# Patient Record
Sex: MALE | Race: WHITE | NOT HISPANIC OR LATINO | Employment: FULL TIME | URBAN - METROPOLITAN AREA
[De-identification: names, ages, dates, MRNs, and addresses within clinical notes are randomized per-mention and may not be internally consistent; named-entity substitution may affect disease eponyms.]

---

## 2023-05-31 ASSESSMENT — ENCOUNTER SYMPTOMS
LEG PAIN: 1
SINUS CONGESTION: 0
NECK MASS: 0
INSOMNIA: 1
EXERCISE INTOLERANCE: 0
SYNCOPE: 0
PALPITATIONS: 1
TROUBLE SWALLOWING: 0
ORTHOPNEA: 0
SLEEP DISTURBANCES DUE TO BREATHING: 0
NERVOUS/ANXIOUS: 1
LIGHT-HEADEDNESS: 1
HOARSE VOICE: 0
DECREASED CONCENTRATION: 1
SMELL DISTURBANCE: 0
HYPOTENSION: 0
SINUS PAIN: 0
TASTE DISTURBANCE: 0
HYPERTENSION: 1
DEPRESSION: 1
PANIC: 0
SORE THROAT: 1

## 2023-06-01 ENCOUNTER — OFFICE VISIT (OUTPATIENT)
Dept: INTERNAL MEDICINE | Facility: CLINIC | Age: 28
End: 2023-06-01
Payer: COMMERCIAL

## 2023-06-01 ENCOUNTER — LAB (OUTPATIENT)
Dept: LAB | Facility: CLINIC | Age: 28
End: 2023-06-01
Payer: COMMERCIAL

## 2023-06-01 VITALS
WEIGHT: 188.6 LBS | DIASTOLIC BLOOD PRESSURE: 67 MMHG | HEART RATE: 54 BPM | SYSTOLIC BLOOD PRESSURE: 103 MMHG | OXYGEN SATURATION: 97 %

## 2023-06-01 DIAGNOSIS — M25.551 HIP PAIN, RIGHT: ICD-10-CM

## 2023-06-01 DIAGNOSIS — M22.2X2 PATELLOFEMORAL SYNDROME, LEFT: ICD-10-CM

## 2023-06-01 DIAGNOSIS — Z13.6 CARDIOVASCULAR SCREENING; LDL GOAL LESS THAN 160: Primary | ICD-10-CM

## 2023-06-01 DIAGNOSIS — Z13.6 CARDIOVASCULAR SCREENING; LDL GOAL LESS THAN 160: ICD-10-CM

## 2023-06-01 DIAGNOSIS — Z00.00 HEALTHCARE MAINTENANCE: ICD-10-CM

## 2023-06-01 LAB
ANION GAP SERPL CALCULATED.3IONS-SCNC: 8 MMOL/L (ref 7–15)
BUN SERPL-MCNC: 16.7 MG/DL (ref 6–20)
CALCIUM SERPL-MCNC: 9.8 MG/DL (ref 8.6–10)
CHLORIDE SERPL-SCNC: 103 MMOL/L (ref 98–107)
CHOLEST SERPL-MCNC: 177 MG/DL
CREAT SERPL-MCNC: 1.08 MG/DL (ref 0.67–1.17)
DEPRECATED HCO3 PLAS-SCNC: 30 MMOL/L (ref 22–29)
GFR SERPL CREATININE-BSD FRML MDRD: >90 ML/MIN/1.73M2
GLUCOSE SERPL-MCNC: 93 MG/DL (ref 70–99)
HDLC SERPL-MCNC: 58 MG/DL
LDLC SERPL CALC-MCNC: 92 MG/DL
NONHDLC SERPL-MCNC: 119 MG/DL
POTASSIUM SERPL-SCNC: 4 MMOL/L (ref 3.4–5.3)
SODIUM SERPL-SCNC: 141 MMOL/L (ref 136–145)
TRIGL SERPL-MCNC: 135 MG/DL

## 2023-06-01 PROCEDURE — 36415 COLL VENOUS BLD VENIPUNCTURE: CPT | Performed by: PATHOLOGY

## 2023-06-01 PROCEDURE — 80061 LIPID PANEL: CPT | Performed by: PATHOLOGY

## 2023-06-01 PROCEDURE — 80048 BASIC METABOLIC PNL TOTAL CA: CPT | Performed by: PATHOLOGY

## 2023-06-01 PROCEDURE — 99203 OFFICE O/P NEW LOW 30 MIN: CPT | Performed by: HOSPITALIST

## 2023-06-01 RX ORDER — ACETAMINOPHEN 500 MG
500 TABLET ORAL
COMMUNITY

## 2023-06-01 RX ORDER — PROPRANOLOL HYDROCHLORIDE 20 MG/1
20 TABLET ORAL DAILY PRN
COMMUNITY
Start: 2023-06-01 | End: 2023-11-14

## 2023-06-01 RX ORDER — PROPRANOLOL HYDROCHLORIDE 20 MG/1
1 TABLET ORAL
COMMUNITY
Start: 2023-02-22 | End: 2023-06-01

## 2023-06-01 RX ORDER — CITALOPRAM HYDROBROMIDE 20 MG/1
1 TABLET ORAL
COMMUNITY
Start: 2023-02-22 | End: 2023-11-14

## 2023-06-01 ASSESSMENT — ENCOUNTER SYMPTOMS
CONSTIPATION: 0
DIARRHEA: 0
PALPITATIONS: 1
NERVOUS/ANXIOUS: 1
ABDOMINAL PAIN: 0
SORE THROAT: 1
SINUS PAIN: 0
CHILLS: 0
SHORTNESS OF BREATH: 0
FEVER: 0
DECREASED CONCENTRATION: 1
ARTHRALGIAS: 1
LIGHT-HEADEDNESS: 1
DYSURIA: 0
FREQUENCY: 0
TROUBLE SWALLOWING: 0

## 2023-06-01 NOTE — PROGRESS NOTES
Assessment/Plan  Problem List Items Addressed This Visit        Nervous and Auditory    Hip pain, right     Likely tendon strain of upper quadraceps and adductors.   - If right hip worsen, may consider physical therapy.          Relevant Medications    acetaminophen (TYLENOL) 500 MG tablet    Patellofemoral syndrome, left     - If left knee worsen, may consider physical therapy.          Relevant Medications    acetaminophen (TYLENOL) 500 MG tablet       Other    CARDIOVASCULAR SCREENING; LDL GOAL LESS THAN 160 - Primary    Relevant Orders    Basic metabolic panel  (Ca, Cl, CO2, Creat, Gluc, K, Na, BUN)    Lipid panel reflex to direct LDL Fasting    Healthcare maintenance     - If you haven't had a Tetanus vaccine in the past 10 years, would recommend obtaining at Pharmacy.   - Consider HPV vaccine completion, need #2 and #3 vaccines. May set up a nurse visit or go to pharmacy.   - Labs for BMP and Lipid while fasting.            No results found for any visits on 06/01/23.    Health Maintenance Due   Topic Date Due     YEARLY PREVENTIVE VISIT  Never done     ADVANCE CARE PLANNING  Never done     HIV SCREENING  Never done     HEPATITIS C SCREENING  Never done     HPV IMMUNIZATION (3 - Male 3-dose series) 07/18/2020     PHQ-2 (once per calendar year)  Never done         Subjective  Here to establish care. Was living in [place], [place] for about 2 years. Moved here in 4/2022. Started on celexa and propranolol about 3 years ago. Does have a seldom coffee but no other caffeine.     Mentions he has right hip pain along the anterior region when exercising with walking and strength. Has prior knee patellofemoral syndrome for left knee and no longer runs, does have swelling frequently. Did not have physical therapy for knee. Wears shoe inserts but maybe wearing down. Takes ibuprofen once about every other day.     Mentions left wrist fracture in grad school has trouble bending when it gets cold out.      Review of Systems    Constitutional: Negative for chills and fever.   HENT: Positive for hearing loss, sore throat and tinnitus. Negative for ear discharge, ear pain, mouth sores, nosebleeds, sinus pain and trouble swallowing.    Respiratory: Negative for shortness of breath.    Cardiovascular: Positive for palpitations. Negative for chest pain.   Gastrointestinal: Negative for abdominal pain, constipation and diarrhea.   Genitourinary: Negative for dysuria and frequency.   Musculoskeletal: Positive for arthralgias.   Skin: Negative for rash.   Neurological: Positive for light-headedness.   Psychiatric/Behavioral: Positive for decreased concentration. The patient is nervous/anxious.        History  Past Medical History:   Diagnosis Date     Anxiety      Closed fracture of left clavicle      Hip pain, right      Hx of concussion     in High school, while sledding.     Patellofemoral syndrome, left      Wrist fracture, left, closed, initial encounter     grad school       Past Surgical History:   Procedure Laterality Date     TONSILLECTOMY       WISDOM TOOTH EXTRACTION         Family History   Problem Relation Age of Onset     Prostate Cancer Father 55     Diabetes Father      Cerebrovascular Disease Brother      Dementia Maternal Grandmother      Cerebrovascular Disease Paternal Grandmother      Dementia Paternal Grandmother      Colon Cancer Paternal Uncle         unknown age       Social History     Tobacco Use     Smoking status: Not on file     Smokeless tobacco: Not on file   Substance Use Topics     Alcohol use: Not on file        Objective  /67 (BP Location: Right arm, Patient Position: Sitting, Cuff Size: Adult Regular)   Pulse 97   Wt 85.5 kg (188 lb 9.6 oz)   SpO2 (!) 54%   Vitals taken by Froy Huerta MD    Physical Exam  Constitutional:       General: He is not in acute distress.     Appearance: He is not ill-appearing or toxic-appearing.   HENT:      Head: Normocephalic.   Eyes:      Conjunctiva/sclera:  Conjunctivae normal.   Cardiovascular:      Rate and Rhythm: Regular rhythm.      Heart sounds: Normal heart sounds. No murmur heard.     No friction rub. No gallop.   Pulmonary:      Effort: Pulmonary effort is normal. No respiratory distress.      Breath sounds: Normal breath sounds. No wheezing, rhonchi or rales.   Abdominal:      General: Bowel sounds are normal. There is no distension.      Palpations: Abdomen is soft. There is no mass.      Tenderness: There is no abdominal tenderness.      Hernia: No hernia is present.   Musculoskeletal:      Right lower leg: No edema.      Left lower leg: No edema.      Comments: Left lateral patella with mild tenderness. Mild left knee effusion. Mild effusion of right knee. No laxity of left knee.  Right anterior superior quadraceps with mild tenderness. No tenderness pushing right leg into right hip.    Neurological:      Mental Status: He is alert.   Psychiatric:         Mood and Affect: Mood normal.         Thought Content: Thought content normal.          35 minutes spent on the date of the encounter doing chart review, history and exam, documentation and further activities per the note.      Return in about 1 year (around 6/1/2024).        Froy Huerta MD  [facility] INTERNAL MEDICINE [place]    Answers for HPI/ROS submitted by the patient on 5/31/2023  General Symptoms: No  Skin Symptoms: No  HENT Symptoms: Yes  EYE SYMPTOMS: No  HEART SYMPTOMS: Yes  LUNG SYMPTOMS: No  INTESTINAL SYMPTOMS: No  URINARY SYMPTOMS: No  REPRODUCTIVE SYMPTOMS: No  SKELETAL SYMPTOMS: No  BLOOD SYMPTOMS: No  NERVOUS SYSTEM SYMPTOMS: No  MENTAL HEALTH SYMPTOMS: Yes  Congestion: No   Voice hoarseness: No  Tooth pain: No  Gum tenderness: No  Bleeding gums: No  Change in taste: No  Change in sense of smell: No  Dry mouth: No  Hearing aid used: No  Neck lump: No  Pain in legs with walking: Yes  Trouble breathing while lying down: No  Fingers or toes appear blue: No  High  blood pressure: Yes  Low blood pressure: No  Fainting: No  Murmurs: Yes  Pacemaker: No  Varicose veins: No  Edema or swelling: No  Wake up at night with shortness of breath: No  Exercise intolerance: No  Depression: Yes  Trouble sleeping: Yes  Mood changes: Yes  Panic attacks: No

## 2023-06-01 NOTE — ASSESSMENT & PLAN NOTE
- If you haven't had a Tetanus vaccine in the past 10 years, would recommend obtaining at Pharmacy.   - Consider HPV vaccine completion, need #2 and #3 vaccines. May set up a nurse visit or go to pharmacy.   - Labs for BMP and Lipid while fasting.

## 2023-06-01 NOTE — NURSING NOTE
Jack Collier is a 27 year old male patient that presents today in clinic for the following:    Chief Complaint   Patient presents with     Establish Care     The patient's allergies and medications were reviewed as noted. A set of vitals were recorded as noted without incident: /67 (BP Location: Right arm, Patient Position: Sitting, Cuff Size: Adult Regular)   Pulse 54   Wt 85.5 kg (188 lb 9.6 oz)   SpO2 97% . The patient does not have any other questions for the provider.    Digna Ferrera, EMT at 7:08 AM on 6/1/2023

## 2023-06-01 NOTE — ASSESSMENT & PLAN NOTE
Likely tendon strain of upper quadraceps and adductors.   - If right hip worsen, may consider physical therapy.

## 2023-06-01 NOTE — PATIENT INSTRUCTIONS
- NURIA for [facility] in [place] notes the past 5 years.   - If left knee or right hip worsen, may consider physical therapy.   - If you haven't had a Tetanus vaccine in the past 10 years, would recommend obtaining at Pharmacy.   - Consider HPV vaccine completion, need #2 and #3 vaccines. May set up a nurse visit or go to pharmacy.   - Labs for BMP and Lipid while fasting.  - May set up MyChart for [facility].    Follow up in a year or as needed.

## 2023-07-26 ENCOUNTER — ALLIED HEALTH/NURSE VISIT (OUTPATIENT)
Dept: INTERNAL MEDICINE | Facility: CLINIC | Age: 28
End: 2023-07-26
Payer: COMMERCIAL

## 2023-07-26 DIAGNOSIS — Z23 NEED FOR VACCINATION: Primary | ICD-10-CM

## 2023-07-26 PROCEDURE — 90651 9VHPV VACCINE 2/3 DOSE IM: CPT

## 2023-07-26 PROCEDURE — 90471 IMMUNIZATION ADMIN: CPT

## 2023-07-26 NOTE — PROGRESS NOTES
Jack Collier received the first HPV vaccine today in clinic at the request of Dr. Huerta. The immunization was given under the supervision of Dr. Johnson if assistance was needed. The patient does not report a history of adverse reactions associated with vaccine administration. The immunization site was cleaned with an alcohol prep wipe. The immunization was given without incident--see immunization list for administration details. No swelling or redness was observed at the site of injection after the immunization was given. The patient was advised to remain in fourth floor lobby of the [facility] for fifteen minutes after the injection in case of an adverse reaction.     KEVIN Agrawal   3:16 PM 7/26/2023

## 2023-08-13 ENCOUNTER — HEALTH MAINTENANCE LETTER (OUTPATIENT)
Age: 28
End: 2023-08-13

## 2023-08-28 ENCOUNTER — ALLIED HEALTH/NURSE VISIT (OUTPATIENT)
Dept: INTERNAL MEDICINE | Facility: CLINIC | Age: 28
End: 2023-08-28
Payer: COMMERCIAL

## 2023-08-28 DIAGNOSIS — Z23 NEED FOR VACCINATION: Primary | ICD-10-CM

## 2023-08-28 PROCEDURE — 90651 9VHPV VACCINE 2/3 DOSE IM: CPT

## 2023-08-28 PROCEDURE — 90471 IMMUNIZATION ADMIN: CPT

## 2023-08-28 NOTE — PROGRESS NOTES
Chief Complaint   Patient presents with    Imm/Inj     2nd HPV       Patient Active Problem List   Diagnosis    Hip pain, right    Patellofemoral syndrome, left    CARDIOVASCULAR SCREENING; LDL GOAL LESS THAN 160    Healthcare maintenance       No Known Allergies    Current Outpatient Medications   Medication Sig Dispense Refill    acetaminophen (TYLENOL) 500 MG tablet Take 500 mg by mouth      citalopram (CELEXA) 20 MG tablet Take 1 tablet by mouth daily at 2 pm      propranolol (INDERAL) 20 MG tablet Take 1 tablet (20 mg) by mouth daily as needed (anxiety)         Social History     Tobacco Use    Smoking status: Former     Packs/day: 0.20     Years: 6.00     Pack years: 1.20     Types: Cigarettes     Quit date: 2022     Years since quittin.4    Smokeless tobacco: Former     Types: Chew     Quit date: 2022   Substance Use Topics    Alcohol use: Yes     Alcohol/week: 3.0 standard drinks of alcohol     Types: 3 Standard drinks or equivalent per week     Comment: social events, occasional weekend with a glass of wine    Drug use: Yes     Types: Marijuana     Comment: occasional a few times every couple months       Jack Collier comes into clinic today at the request of Dr. Froy Huerta for second HPV immunization.    Patient diagnosis: Need for vaccination    This service provided today was under the direct supervision of Dr. Valles, who was available if needed.    The following medication was given (also documented on immunization record):     MEDICATION:  Human Papillomavirus 9-valent Vaccine (Gardasil-9)  ROUTE: IM  SITE: Deltoid - Left  DOSE: Single dose - 0.5 mL  LOT #: [id number]  : [employer], [employer]  EXPIRATION DATE: Oct 20 2024  NDC#: 9759-3165-77   Was there drug waste? No    Prior to injection, verified patient identity using patient's name and date of birth.  Due to injection administration, patient instructed to remain in clinic for 15 minutes  afterwards, and to report any  adverse reaction to me immediately.    Drug Amount Wasted:  None.  Vial/Syringe: Single dose vial    Lizz Garibay LPN  August 28, 2023  9:46 AM

## 2023-09-18 ENCOUNTER — ALLIED HEALTH/NURSE VISIT (OUTPATIENT)
Dept: INTERNAL MEDICINE | Facility: CLINIC | Age: 28
End: 2023-09-18
Payer: COMMERCIAL

## 2023-09-18 DIAGNOSIS — Z23 NEED FOR VACCINATION: Primary | ICD-10-CM

## 2023-09-18 PROCEDURE — 90471 IMMUNIZATION ADMIN: CPT

## 2023-09-18 PROCEDURE — 90686 IIV4 VACC NO PRSV 0.5 ML IM: CPT

## 2023-09-18 NOTE — PROGRESS NOTES
Jack Collier received the flu vaccine today in clinic at the request of the patient. The immunization was given under the supervision of Dr. Johnson if assistance was needed. The patient does not report a history of adverse reactions associated with vaccine administration. The immunization site was cleaned with an alcohol prep wipe. The immunization was given without incident--see immunization list for administration details. No swelling or redness was observed at the site of injection after the immunization was given. The patient was advised to remain in fourth floor lobby of the [facility] for fifteen minutes after the injection in case of an adverse reaction.     KEVIN Agrawal   10:49 AM 9/18/2023

## 2023-11-14 ENCOUNTER — MYC REFILL (OUTPATIENT)
Dept: INTERNAL MEDICINE | Facility: CLINIC | Age: 28
End: 2023-11-14
Payer: COMMERCIAL

## 2023-11-14 DIAGNOSIS — F41.9 ANXIETY: Primary | ICD-10-CM

## 2023-11-14 NOTE — TELEPHONE ENCOUNTER
propranolol (INDERAL) 20 MG tablet   6/1/2023  No   Sig - Route: Take 1 tablet (20 mg) by mouth daily as needed (anxiety) - Oral   Class: Historical   Order: [id number]          citalopram (CELEXA) 20 MG tablet   2/22/2023  --   Sig - Route: Take 1 tablet by mouth daily at 2 pm - Oral   Class: Historical   Order: [id number]         Last Office Visit : 6/1/23  Future Office visit:  0    Routing refill request to provider for review/approval because:  Medication is reported/historical

## 2023-11-16 RX ORDER — PROPRANOLOL HYDROCHLORIDE 20 MG/1
20 TABLET ORAL DAILY PRN
Qty: 90 TABLET | Refills: 1 | Status: SHIPPED | OUTPATIENT
Start: 2023-11-16 | End: 2024-02-15

## 2023-11-16 RX ORDER — CITALOPRAM HYDROBROMIDE 20 MG/1
20 TABLET ORAL
Qty: 90 TABLET | Refills: 1 | Status: SHIPPED | OUTPATIENT
Start: 2023-11-16 | End: 2024-02-15

## 2024-01-29 ENCOUNTER — ALLIED HEALTH/NURSE VISIT (OUTPATIENT)
Dept: INTERNAL MEDICINE | Facility: CLINIC | Age: 29
End: 2024-01-29
Payer: COMMERCIAL

## 2024-01-29 DIAGNOSIS — Z23 NEED FOR VACCINATION: Primary | ICD-10-CM

## 2024-01-29 PROCEDURE — 99207 PR NO CHARGE NURSE ONLY: CPT

## 2024-01-29 PROCEDURE — 90651 9VHPV VACCINE 2/3 DOSE IM: CPT

## 2024-01-29 PROCEDURE — 90471 IMMUNIZATION ADMIN: CPT

## 2024-01-29 NOTE — PROGRESS NOTES
Jack Collier received the third and final HPV vaccine today in clinic at the request of Dr. Huerta. The immunization was given under the supervision of Dr. Johnson if assistance was needed. The patient does not report a history of adverse reactions associated with vaccine administration. The immunization site was cleaned with an alcohol prep wipe. The immunization was given without incident--see immunization list for administration details. No swelling or redness was observed at the site of injection after the immunization was given. The patient was advised to remain in fourth floor lobby of the [facility] for fifteen minutes after the injection in case of an adverse reaction.     Digna Ferrera, EMT at 9:15 AM on 1/29/2024

## 2024-02-05 ENCOUNTER — TELEPHONE (OUTPATIENT)
Dept: INTERNAL MEDICINE | Facility: CLINIC | Age: 29
End: 2024-02-05
Payer: COMMERCIAL

## 2024-02-05 NOTE — TELEPHONE ENCOUNTER
Left Voicemail (1st Attempt) and Sent Mychart (1st Attempt) for the patient to call back and schedule the following:    Appointment type: P PHYSICAL  Provider: PCP  Return date: 6/1/2024

## 2024-02-15 ENCOUNTER — OFFICE VISIT (OUTPATIENT)
Dept: INTERNAL MEDICINE | Facility: CLINIC | Age: 29
End: 2024-02-15
Payer: COMMERCIAL

## 2024-02-15 VITALS
HEIGHT: 74 IN | WEIGHT: 203.3 LBS | OXYGEN SATURATION: 99 % | DIASTOLIC BLOOD PRESSURE: 69 MMHG | BODY MASS INDEX: 26.09 KG/M2 | SYSTOLIC BLOOD PRESSURE: 102 MMHG | HEART RATE: 74 BPM

## 2024-02-15 DIAGNOSIS — H93.12 TINNITUS, LEFT: ICD-10-CM

## 2024-02-15 DIAGNOSIS — H66.90 ACUTE OTITIS MEDIA, UNSPECIFIED OTITIS MEDIA TYPE: Primary | ICD-10-CM

## 2024-02-15 DIAGNOSIS — F41.9 ANXIETY: ICD-10-CM

## 2024-02-15 PROCEDURE — 99214 OFFICE O/P EST MOD 30 MIN: CPT | Mod: GC

## 2024-02-15 RX ORDER — CITALOPRAM HYDROBROMIDE 20 MG/1
20 TABLET ORAL
Qty: 90 TABLET | Refills: 1 | Status: SHIPPED | OUTPATIENT
Start: 2024-02-15 | End: 2024-05-01

## 2024-02-15 RX ORDER — AMOXICILLIN AND CLAVULANATE POTASSIUM 562.5; 437.5; 62.5 MG/1; MG/1; MG/1
2 TABLET, MULTILAYER, EXTENDED RELEASE ORAL 2 TIMES DAILY
Qty: 20 TABLET | Refills: 0 | Status: SHIPPED | OUTPATIENT
Start: 2024-02-15 | End: 2024-02-20

## 2024-02-15 RX ORDER — PROPRANOLOL HYDROCHLORIDE 20 MG/1
20 TABLET ORAL DAILY PRN
Qty: 90 TABLET | Refills: 1 | Status: SHIPPED | OUTPATIENT
Start: 2024-02-15 | End: 2024-05-01

## 2024-02-15 NOTE — PROGRESS NOTES
Jack is a 28 year old that presents in clinic today for the following:     Chief Complaint   Patient presents with    Derm Problem     Pt has derm concerns on left side of head which has resolved and ear issues (has ringing ears, recurrent pain, trouble hearing) since 2/10/24           2/15/2024     9:15 AM   Additional Questions   Roomed by Lamar Shell   Accompanied by N/A     Screenings as of today     PHQ-2 Total Score (Adult) - Positive if 3 or more points; Administer   PHQ-9 if positive 1        Lamar Shell at 9:22 AM on 2/15/2024

## 2024-02-15 NOTE — PROGRESS NOTES
"=================  Subjective  ==============  Jack presents for the issues below.     Cherry angioma but in the interim has fully resolved this was on the left temporal area, and has had one before on the abdomen and was removed and the right epigastrium which I saw.   2. Ear issues - ringing ears recurrent pain trouble hearing since 2/10/2024 after thinking he used a qtip to push too hard and has since had left ear tinnitus, and pain, reduced hearing (per patient muffled)     Would also want refills for meds. (Propranolol and citalopram)   No fevers, chills, other systemic signs, vision.   Has had ear infections as a child. Remembers has taken amoxicillin before likely in high school he said; ( about 10 years or so ago)     Counseled on apt antibiotic use.         Derm Problem        Pt has derm concerns on left side of head which has resolved and ear issues (has ringing ears, recurrent pain, trouble hearing) since 2/10/24       ==============  Objective  ============  /69 (BP Location: Right arm, Patient Position: Sitting, Cuff Size: Adult Large)   Pulse 74   Ht 1.871 m (6' 1.66\")   Wt 92.2 kg (203 lb 4.8 oz)   SpO2 99%   BMI 26.34 kg/m        .General: alert and oriented x3, in no apparent cardiopulmonary distress   HEENT: normocephalic, atraumatic, MMM, PERRL, EOMI, no scleral icterus or chemosis, no conjunctival pallor   Right ear: normal appearance of tympanic membrane.   Initial left ear: questionable with cerumen behind tympanic membrane incomplete fluid level on the 8 o'clock position of ear drum, large cerumen burding bright bailey yellow c/f for trauma and with gentle washing,   Repeat left ear after gentle washing: repeat exam then notable for discontinous tympanic membrane, appears from blunt trauma.   Neurologic: Cranial nerve VIII bone conduction lateralizing to the left and reduced air conduction on the left.   5/5 strength in all 4 extremities, no dysmetria, normal gait, sensation " grossly intact   Pulmonary: breathing on room air.   Skin: No rashes, ecchymosis   Extremities: warm and well-perfused.  Musculoskeletal: No joint swelling, full ROM in all large joints   Psychiatric: Linear thought processes, normal speech, mood appropriate affect.       Labs reviewed- none   Imaging reviewed - none     ======================  Assessment and Plan   ======================  Jack Collier is a 28 y.o. male presenting with 5 day history of left ear pain, tinnitus, muffled hearing in setting of preceding subjectively perceived trauma using q-tip on the left ear precipitating these symptom. Exam as above initially questionable with cerumen behind tympanic membrane c/f for trauma and with gentle washing, repeat exam then notable for discontinuous tympanic membrane after ear consistent with possible blunt trauma with q-tip as per history. Conduction defect in left ear too it seems, given all these, will treat with a course of ammox-clav for 5 days for what may be otitis media (incomplete fluid level observed and foreign matter in wrong compartment) along with referral to ENT for ongoing management.     Jack was seen today for derm problem.    Diagnoses and all orders for this visit:    Acute otitis media, unspecified otitis media type  -     Adult ENT  Referral; Future  -     amoxicillin-clavulanate (AUGMENTIN XR) 1000-62.5 MG 12 hr tablet; Take 2 tablets by mouth 2 times daily for 5 days    Tinnitus, left  -     amoxicillin-clavulanate (AUGMENTIN XR) 1000-62.5 MG 12 hr tablet; Take 2 tablets by mouth 2 times daily for 5 days    Anxiety  -     citalopram (CELEXA) 20 MG tablet; Take 1 tablet (20 mg) by mouth daily at 2 pm  -     propranolol (INDERAL) 20 MG tablet; Take 1 tablet (20 mg) by mouth daily as needed (anxiety)          Follow up as needed;   Follow up with ENT as referred;appreciate their input.       Case discussed and patient seen and plans above jointly made attending Dr. Huerta.      Molly Jackson MD  PGY3   Internal Medicine  [facility]

## 2024-05-01 ENCOUNTER — OFFICE VISIT (OUTPATIENT)
Dept: INTERNAL MEDICINE | Facility: CLINIC | Age: 29
End: 2024-05-01
Payer: COMMERCIAL

## 2024-05-01 ENCOUNTER — LAB (OUTPATIENT)
Dept: LAB | Facility: CLINIC | Age: 29
End: 2024-05-01
Payer: COMMERCIAL

## 2024-05-01 VITALS
WEIGHT: 205.6 LBS | HEART RATE: 57 BPM | OXYGEN SATURATION: 96 % | DIASTOLIC BLOOD PRESSURE: 68 MMHG | SYSTOLIC BLOOD PRESSURE: 107 MMHG | BODY MASS INDEX: 26.64 KG/M2

## 2024-05-01 DIAGNOSIS — F41.9 ANXIETY: ICD-10-CM

## 2024-05-01 DIAGNOSIS — Z11.4 SCREENING FOR HIV (HUMAN IMMUNODEFICIENCY VIRUS): ICD-10-CM

## 2024-05-01 DIAGNOSIS — Z11.59 NEED FOR HEPATITIS C SCREENING TEST: ICD-10-CM

## 2024-05-01 DIAGNOSIS — Z00.00 ROUTINE GENERAL MEDICAL EXAMINATION AT A HEALTH CARE FACILITY: Primary | ICD-10-CM

## 2024-05-01 PROBLEM — F32.A DEPRESSION: Status: ACTIVE | Noted: 2020-10-23

## 2024-05-01 PROBLEM — H53.022 REFRACTIVE AMBLYOPIA OF LEFT EYE: Status: ACTIVE | Noted: 2024-05-01

## 2024-05-01 LAB
ANION GAP SERPL CALCULATED.3IONS-SCNC: 9 MMOL/L (ref 7–15)
BUN SERPL-MCNC: 12.1 MG/DL (ref 6–20)
CALCIUM SERPL-MCNC: 9.6 MG/DL (ref 8.6–10)
CHLORIDE SERPL-SCNC: 103 MMOL/L (ref 98–107)
CREAT SERPL-MCNC: 1.06 MG/DL (ref 0.67–1.17)
DEPRECATED HCO3 PLAS-SCNC: 28 MMOL/L (ref 22–29)
EGFRCR SERPLBLD CKD-EPI 2021: >90 ML/MIN/1.73M2
GLUCOSE SERPL-MCNC: 96 MG/DL (ref 70–99)
HCV AB SERPL QL IA: NONREACTIVE
HIV 1+2 AB+HIV1 P24 AG SERPL QL IA: NONREACTIVE
POTASSIUM SERPL-SCNC: 4.5 MMOL/L (ref 3.4–5.3)
SODIUM SERPL-SCNC: 140 MMOL/L (ref 135–145)
TSH SERPL DL<=0.005 MIU/L-ACNC: 2.04 UIU/ML (ref 0.3–4.2)

## 2024-05-01 PROCEDURE — 99395 PREV VISIT EST AGE 18-39: CPT

## 2024-05-01 PROCEDURE — 84443 ASSAY THYROID STIM HORMONE: CPT | Performed by: PATHOLOGY

## 2024-05-01 PROCEDURE — 36415 COLL VENOUS BLD VENIPUNCTURE: CPT | Performed by: PATHOLOGY

## 2024-05-01 PROCEDURE — 80048 BASIC METABOLIC PNL TOTAL CA: CPT | Performed by: PATHOLOGY

## 2024-05-01 PROCEDURE — 86803 HEPATITIS C AB TEST: CPT

## 2024-05-01 PROCEDURE — 99000 SPECIMEN HANDLING OFFICE-LAB: CPT | Performed by: PATHOLOGY

## 2024-05-01 PROCEDURE — 87389 HIV-1 AG W/HIV-1&-2 AB AG IA: CPT

## 2024-05-01 RX ORDER — PROPRANOLOL HYDROCHLORIDE 20 MG/1
20 TABLET ORAL 2 TIMES DAILY PRN
Qty: 180 TABLET | Refills: 1 | Status: SHIPPED | OUTPATIENT
Start: 2024-05-01

## 2024-05-01 RX ORDER — CITALOPRAM HYDROBROMIDE 20 MG/1
20 TABLET ORAL DAILY
Qty: 90 TABLET | Refills: 3 | Status: SHIPPED | OUTPATIENT
Start: 2024-05-01

## 2024-05-01 SDOH — HEALTH STABILITY: PHYSICAL HEALTH: ON AVERAGE, HOW MANY DAYS PER WEEK DO YOU ENGAGE IN MODERATE TO STRENUOUS EXERCISE (LIKE A BRISK WALK)?: 3 DAYS

## 2024-05-01 ASSESSMENT — ANXIETY QUESTIONNAIRES
6. BECOMING EASILY ANNOYED OR IRRITABLE: SEVERAL DAYS
3. WORRYING TOO MUCH ABOUT DIFFERENT THINGS: MORE THAN HALF THE DAYS
GAD7 TOTAL SCORE: 11
7. FEELING AFRAID AS IF SOMETHING AWFUL MIGHT HAPPEN: MORE THAN HALF THE DAYS
IF YOU CHECKED OFF ANY PROBLEMS ON THIS QUESTIONNAIRE, HOW DIFFICULT HAVE THESE PROBLEMS MADE IT FOR YOU TO DO YOUR WORK, TAKE CARE OF THINGS AT HOME, OR GET ALONG WITH OTHER PEOPLE: SOMEWHAT DIFFICULT
1. FEELING NERVOUS, ANXIOUS, OR ON EDGE: MORE THAN HALF THE DAYS
GAD7 TOTAL SCORE: 11
2. NOT BEING ABLE TO STOP OR CONTROL WORRYING: SEVERAL DAYS
5. BEING SO RESTLESS THAT IT IS HARD TO SIT STILL: SEVERAL DAYS

## 2024-05-01 ASSESSMENT — PATIENT HEALTH QUESTIONNAIRE - PHQ9
SUM OF ALL RESPONSES TO PHQ QUESTIONS 1-9: 6
5. POOR APPETITE OR OVEREATING: MORE THAN HALF THE DAYS

## 2024-05-01 ASSESSMENT — SOCIAL DETERMINANTS OF HEALTH (SDOH): HOW OFTEN DO YOU GET TOGETHER WITH FRIENDS OR RELATIVES?: ONCE A WEEK

## 2024-05-01 NOTE — PATIENT INSTRUCTIONS
Preventive Care Advice   This is general advice given by our system to help you stay healthy. However, your care team may have specific advice just for you. Please talk to your care team about your preventive care needs.  Nutrition  Eat 5 or more servings of fruits and vegetables each day.  Try wheat bread, brown rice and whole grain pasta (instead of white bread, rice, and pasta).  Get enough calcium and vitamin D. Check the label on foods and aim for 100% of the RDA (recommended daily allowance).  Lifestyle  Exercise at least 150 minutes each week   (30 minutes a day, 5 days a week).  Do muscle strengthening activities 2 days a week. These help control your weight and prevent disease.  No smoking.  Wear sunscreen to prevent skin cancer.  Have a dental exam and cleaning every 6 months.  Yearly exams  See your health care team every year to talk about:  Any changes in your health.  Any medicines your care team has prescribed.  Preventive care, family planning, and ways to prevent chronic diseases.  Shots (vaccines)   HPV shots (up to age 26), if you've never had them before.  Hepatitis B shots (up to age 59), if you've never had them before.  COVID-19 shot: Get this shot when it's due.  Flu shot: Get a flu shot every year.  Tetanus shot: Get a tetanus shot every 10 years.  Pneumococcal, hepatitis A, and RSV shots: Ask your care team if you need these based on your risk.  Shingles shot (for age 50 and up).  General health tests  Diabetes screening:  Starting at age 35, Get screened for diabetes at least every 3 years.  If you are younger than age 35, ask your care team if you should be screened for diabetes.  Cholesterol test: At age 39, start having a cholesterol test every 5 years, or more often if advised.  Bone density scan (DEXA): At age 50, ask your care team if you should have this scan for osteoporosis (brittle bones).  Hepatitis C: Get tested at least once in your life.  STIs (sexually transmitted  infections)  Before age 24: Ask your care team if you should be screened for STIs.  After age 24: Get screened for STIs if you're at risk. You are at risk for STIs (including HIV) if:  You are sexually active with more than one person.  You don't use condoms every time.  You or a partner was diagnosed with a sexually transmitted infection.  If you are at risk for HIV, ask about PrEP medicine to prevent HIV.  Get tested for HIV at least once in your life, whether you are at risk for HIV or not.  Cancer screening tests  Cervical cancer screening: If you have a cervix, begin getting regular cervical cancer screening tests at age 21. Most people who have regular screenings with normal results can stop after age 65. Talk about this with your provider.  Breast cancer scan (mammogram): If you've ever had breasts, begin having regular mammograms starting at age 40. This is a scan to check for breast cancer.  Colon cancer screening: It is important to start screening for colon cancer at age 45.  Have a colonoscopy test every 10 years (or more often if you're at risk) Or, ask your provider about stool tests like a FIT test every year or Cologuard test every 3 years.  To learn more about your testing options, visit: https://[email or web address]/[email or web address].  For help making a decision, visit: https://bit.ly/ur93462.  Prostate cancer screening test: If you have a prostate and are age 55 to 69, ask your provider if you would benefit from a yearly prostate cancer screening test.  Lung cancer screening: If you are a current or former smoker age 50 to 80, ask your care team if ongoing lung cancer screenings are right for you.  For informational purposes only. Not to replace the advice of your health care provider. Copyright   2023 [employer]. All rights reserved. Clinically reviewed by the [facility] Transitions Program. Superb[employer] 705176 - REV 01/24.    Learning About Stress  What is stress?     Stress is your  body's response to a hard situation. Your body can have a physical, emotional, or mental response. Stress is a fact of life for most people, and it affects everyone differently. What causes stress for you may not be stressful for someone else.  A lot of things can cause stress. You may feel stress when you go on a job interview, take a test, or run a race. This kind of short-term stress is normal and even useful. It can help you if you need to work hard or react quickly. For example, stress can help you finish an important job on time.  Long-term stress is caused by ongoing stressful situations or events. Examples of long-term stress include long-term health problems, ongoing problems at work, or conflicts in your family. Long-term stress can harm your health.  How does stress affect your health?  When you are stressed, your body responds as though you are in danger. It makes hormones that speed up your heart, make you breathe faster, and give you a burst of energy. This is called the fight-or-flight stress response. If the stress is over quickly, your body goes back to normal and no harm is done.  But if stress happens too often or lasts too long, it can have bad effects. Long-term stress can make you more likely to get sick, and it can make symptoms of some diseases worse. If you tense up when you are stressed, you may develop neck, shoulder, or low back pain. Stress is linked to high blood pressure and heart disease.  Stress also harms your emotional health. It can make you olmos, tense, or depressed. Your relationships may suffer, and you may not do well at work or school.  What can you do to manage stress?  You can try these things to help manage stress:   Do something active. Exercise or activity can help reduce stress. Walking is a great way to get started. Even everyday activities such as housecleaning or yard work can help.  Try yoga or nate chi. These techniques combine exercise and meditation. You may need  some training at first to learn them.  Do something you enjoy. For example, listen to music or go to a movie. Practice your hobby or do volunteer work.  Meditate. This can help you relax, because you are not worrying about what happened before or what may happen in the future.  Do guided imagery. Imagine yourself in any setting that helps you feel calm. You can use online videos, books, or a teacher to guide you.  Do breathing exercises. For example:  From a standing position, bend forward from the waist with your knees slightly bent. Let your arms dangle close to the floor.  Breathe in slowly and deeply as you return to a standing position. Roll up slowly and lift your head last.  Hold your breath for just a few seconds in the standing position.  Breathe out slowly and bend forward from the waist.  Let your feelings out. Talk, laugh, cry, and express anger when you need to. Talking with supportive friends or family, a counselor, or a bianca leader about your feelings is a healthy way to relieve stress. Avoid discussing your feelings with people who make you feel worse.  Write. It may help to write about things that are bothering you. This helps you find out how much stress you feel and what is causing it. When you know this, you can find better ways to cope.  What can you do to prevent stress?  You might try some of these things to help prevent stress:  Manage your time. This helps you find time to do the things you want and need to do.  Get enough sleep. Your body recovers from the stresses of the day while you are sleeping.  Get support. Your family, friends, and community can make a difference in how you experience stress.  Limit your news feed. Avoid or limit time on social media or news that may make you feel stressed.  Do something active. Exercise or activity can help reduce stress. Walking is a great way to get started.  Where can you learn more?  Go to https://www.healthwise.net/patiented  Enter N032 in the  "search box to learn more about \"Learning About Stress.\"  Current as of: October 24, 2023               Content Version: 14.0    6991-0041 [employer].   Care instructions adapted under license by your healthcare professional. If you have questions about a medical condition or this instruction, always ask your healthcare professional. [employer] disclaims any warranty or liability for your use of this information.      "

## 2024-05-01 NOTE — PROGRESS NOTES
Preventive Care Visit  [facility]  Linda Wolfe NP, Nurse Practitioner Primary Care  May 1, 2024      Assessment & Plan     Routine general medical examination at a health care facility  Completed today.    Anxiety  Increased anxiety after starting his new job about one month ago. Discussed stress reduction. Will increase propranolol to allow BID dosing as he is tolerating this well currently. Reviewed adverse effects, recommend decreasing dose back to once daily if BID PRN dosing not tolerated. He prefers to keep citalopram at current dosing. We discussed therapy referral. He has participated in therapy in the past. He is ok with a referral being placed today and he will consider scheduling this. Increase in anxiety likely situational with starting his new job, though will also check TSH and BMP today based on history.  - propranolol (INDERAL) 20 MG tablet  Dispense: 180 tablet; Refill: 1  - TSH with free T4 reflex  - Basic metabolic panel  (Ca, Cl, CO2, Creat, Gluc, K, Na, BUN)  - Adult Mental Health  Referral  - citalopram (CELEXA) 20 MG tablet  Dispense: 90 tablet; Refill: 3    Screening for HIV (human immunodeficiency virus)  Due for HIV screening. He is agreeable to this today.  - HIV Screening    Need for hepatitis C screening test  Due for HCV screening. He is agreeable for this today.   - Hepatitis C Screen Reflex to HCV RNA Quant and Genotype    Counseling  Appropriate preventive services were discussed with this patient, including applicable screening as appropriate for nutrition, physical activity. Checklist reviewing preventive services available has been given to the patient.  Reviewed patient's diet, addressing concerns and/or questions.   He is at risk for lack of exercise and has been provided with information to increase physical activity for the benefit of his well-being.   The patient's PHQ-9 score is consistent with mild depression. He was  provided with information regarding depression.     Return in about 1 year (around 5/2/2025) for Preventive Visit.      Jesse Santiago is a 28 year old, presenting for the following:  Physical        5/1/2024     7:56 AM   Additional Questions   Roomed by MR        Health Care Directive  Patient does not have a Health Care Directive or Living Will: Discussed advance care planning with patient; however, patient declined at this time.    HILTON Santiago presents to the clinic today for a routine physical. He started a new job about a month ago, works as a  for [employer]. He has had increased stress/anxiety since starting the job, has sometimes needed propranolol twice daily, but overall feels like mood is stable. He is not interested in medication adjustment today, other than allowing for increased propranolol dosing if needed. He tolerates the propranolol well, denies adverse effects from this. He feels more fatigued since starting the new job. Notes he previously worked out daily, has gained weight since college. Feels like metabolism has slowed down. He otherwise feels well without concerns. Hearing has returned to baseline after infection in February. He reports a history of a heart murmur as a child, has not needed intervention per his report.           5/1/2024     8:30 AM   PHQ   PHQ-9 Total Score 6   Q9: Thoughts of better off dead/self-harm past 2 weeks Not at all          5/1/2024     8:30 AM   DANIELLE-7 SCORE   Total Score 11            5/1/2024   General Health   How would you rate your overall physical health? (!) FAIR   Feel stress (tense, anxious, or unable to sleep) Rather much   (!) STRESS CONCERN      5/1/2024   Nutrition   Three or more servings of calcium each day? (!) NO   Diet: Regular (no restrictions)   How many servings of fruit and vegetables per day? (!) 0-1   How many sweetened beverages each day? 0-1         5/1/2024   Exercise   Days per week of moderate/strenous exercise 3  days         2024   Social Factors   Frequency of gathering with friends or relatives Once a week   Worry food won't last until get money to buy more No   Food not last or not have enough money for food? No   Do you have housing?  Yes   Are you worried about losing your housing? No   Lack of transportation? No   Unable to get utilities (heat,electricity)? No         2024   Dental   Dentist two times every year? Yes         2024   TB Screening   Were you born outside of the [place]? No       Today's PHQ-2 Score:       2/15/2024     9:20 AM   PHQ-2 (  [employer])   Q1: Little interest or pleasure in doing things 1   Q2: Feeling down, depressed or hopeless 0   PHQ-2 Score 1             2024     8:30 AM   PHQ   PHQ-9 Total Score 6   Q9: Thoughts of better off dead/self-harm past 2 weeks Not at all          2024     8:30 AM   DANIELLE-7 SCORE   Total Score 11              2024   Substance Use   Alcohol more than 3/day or more than 7/wk No   Do you use any other substances recreationally? (!) CANNABIS PRODUCTS     Social History     Tobacco Use    Smoking status: Former     Current packs/day: 0.00     Average packs/day: 0.2 packs/day for 6.6 years (1.3 ttl pk-yrs)     Types: Cigarettes, Cigars, Hookah, Dip, chew, snus or snuff     Start date: 2015     Quit date: 2022     Years since quittin.0    Smokeless tobacco: Former     Types: Chew     Quit date: 2022   Substance Use Topics    Alcohol use: Yes     Alcohol/week: 4.0 standard drinks of alcohol     Types: 4 Standard drinks or equivalent per week     Comment: 3-4 drinks per week    Drug use: Yes     Types: Marijuana     Comment: daily           2024   One time HIV Screening   Previous HIV test? I don't know         2024   STI Screening   New sexual partner(s) since last STI/HIV test? No         2024   Contraception/Family Planning   Questions about contraception or family planning No     Reviewed and updated as needed this visit  "by Provider   Tobacco  Allergies  Meds  Problems  Med Hx  Surg Hx  Fam Hx  Soc   Hx Sexual Activity          Review of Systems  Constitutional, HEENT, cardiovascular, pulmonary, GI, , musculoskeletal, neuro, skin, endocrine and psych systems are negative, except as otherwise noted.     Objective    Exam  /68 (BP Location: Right arm, Patient Position: Sitting, Cuff Size: Adult Large)   Pulse 57   Wt 93.3 kg (205 lb 9.6 oz)   SpO2 96%   BMI 26.64 kg/m     Estimated body mass index is 26.64 kg/m  as calculated from the following:    Height as of 2/15/24: 1.871 m (6' 1.66\").    Weight as of this encounter: 93.3 kg (205 lb 9.6 oz).    Physical Exam  GENERAL: alert and no distress  EYES: Eyes grossly normal to inspection, PERRL and conjunctivae and sclerae normal  HENT: ear canals and TM's normal, nose and mouth without ulcers or lesions  NECK: no adenopathy, no asymmetry, masses, or scars  RESP: lungs clear to auscultation - no rales, rhonchi or wheezes  CV: regular rate and rhythm, normal S1 S2, no S3 or S4, no murmur, click or rub, no peripheral edema  ABDOMEN: soft, nontender, no hepatosplenomegaly, no masses and bowel sounds normal  MS: no gross musculoskeletal defects noted, no edema  SKIN: no visible suspicious lesions or rashes  NEURO: Normal strength and tone, CNIII-XII intact, mentation intact and speech normal  PSYCH: mentation appears normal, affect normal/bright      Signed Electronically by: Linda Wolfe NP    "

## 2024-06-19 ENCOUNTER — MYC REFILL (OUTPATIENT)
Dept: INTERNAL MEDICINE | Facility: CLINIC | Age: 29
End: 2024-06-19
Payer: COMMERCIAL

## 2024-06-19 DIAGNOSIS — F41.9 ANXIETY: ICD-10-CM

## 2024-06-19 RX ORDER — CITALOPRAM HYDROBROMIDE 20 MG/1
20 TABLET ORAL DAILY
Qty: 90 TABLET | Refills: 3 | Status: CANCELLED | OUTPATIENT
Start: 2024-06-19

## 2024-06-24 NOTE — TELEPHONE ENCOUNTER
citalopram (CELEXA) 20 MG tablet: refills on file  - Rx sent 5/1/2024  Disp:90 (3 mo) R:3  - message sent to patient

## 2024-11-06 ENCOUNTER — MYC MEDICAL ADVICE (OUTPATIENT)
Dept: INTERNAL MEDICINE | Facility: CLINIC | Age: 29
End: 2024-11-06
Payer: COMMERCIAL

## 2024-11-06 NOTE — TELEPHONE ENCOUNTER
Left Voicemail (1st Attempt) and Sent Mychart (1st Attempt) for the patient to call back and schedule the following:    Appointment type: Physical   Provider: PCP  Return date: May 2025   Specialty phone number: [phone]  Additonal Notes: per check out - Return in about 1 year (around 5/2/2025) for Preventive Visit.

## 2024-11-11 ENCOUNTER — TELEPHONE (OUTPATIENT)
Dept: INTERNAL MEDICINE | Facility: CLINIC | Age: 29
End: 2024-11-11
Payer: COMMERCIAL

## 2025-03-07 ENCOUNTER — MYC REFILL (OUTPATIENT)
Dept: INTERNAL MEDICINE | Facility: CLINIC | Age: 30
End: 2025-03-07
Payer: COMMERCIAL

## 2025-03-07 DIAGNOSIS — F41.9 ANXIETY: ICD-10-CM

## 2025-03-10 RX ORDER — PROPRANOLOL HCL 20 MG
20 TABLET ORAL 2 TIMES DAILY PRN
Qty: 180 TABLET | Refills: 0 | Status: SHIPPED | OUTPATIENT
Start: 2025-03-10

## 2025-03-10 NOTE — TELEPHONE ENCOUNTER
Medication Requested: propranolol (INDERAL) 20 MG tablet   ---------------------  Last Written Prescription: 5/1/2024 Disp:180 R:1  ---------------------  Last Visit Date: 5/1/2024  Future Visit Date: 5/2/2025  ----------------------  [x]  Refill decision: Medication refilled per  Medication Refill in Ambulatory Care  policy.    Message sent to patient     []  Refill decision: Medication unable to be refilled by RN due to:     []    Pt not seen within past 12 months;  No FOV;  or FOV exceeds timeframe per protocol requirements  []    Compliance - lapse in therapy/gap in refills; No Shows; Cancellations  []    Verification - order discrepancy, clarification needed, Sig modification needed  []    Controlled medication  []    Medication not included in refill protocol policy  []    Abnormal labs/test:  []    Overdue labs/test:    []    Medication not active on Pt's med list  []    Drug interaction Warning  []    Medication - Last script is Reported/Historical/Transitional  []    Advanced refill request  []    Review Needed: New med; Med adjusted within <= 30 days; Safety Alert; Lab monitoring required  []    Other:     Request from pharmacy:  Requested Prescriptions   Pending Prescriptions Disp Refills    propranolol (INDERAL) 20 MG tablet 180 tablet 1     Sig: Take 1 tablet (20 mg) by mouth 2 times daily as needed (anxiety).       Beta-Blockers Protocol Passed - 3/10/2025 11:25 AM        Passed - Most recent blood pressure under 140/90 in past 12 months     BP Readings from Last 3 Encounters:   05/01/24 107/68   02/15/24 102/69   06/01/23 103/67       No data recorded            Passed - Patient is age 6 or older        Passed - Medication is active on med list and the sig matches. RN to manually verify dose and sig if red X/fail.     If the protocol passes (green check), you do not need to verify med dose and sig.    A prescription matches if they are the same clinical intention.    For Example: once daily and every  morning are the same.    The protocol can not identify upper and lower case letters as matching and will fail.     For Example: Take 1 tablet (50 mg) by mouth daily     TAKE 1 TABLET (50 MG) BY MOUTH DAILY    For all fails (red x), verify dose and sig.    If the refill does match what is on file, the RN can still proceed to approve the refill request.       If they do not match, route to the appropriate provider.             Passed - Medication indicated for associated diagnosis     Medication is associated with one or more of the following diagnoses:     Hypertension (HTN)   Atrial fibrillation/flutter   Angina   ASCVD   Migraine   Heart Failure   Tremor   Anxiety   Ocular hypertension   Glaucoma   PTSD   Obstructive hypertrophic cardiomyopathy   History of myocarditis   Palpitations   POTS (postural orthostatic tachycardia syndrome)   SVT (supraventricular tachycardia)   Hyperthyroidism   Tachycardia   Acute non-st segment elevation myocardial infarction   Subsequent non-st segment elevation myocardial infarction   Ischemic myocardial dysfunction          Passed - Recent (12 mo) or future (90 days) visit within the authorizing provider's specialty     The patient must have completed an in-person or virtual visit within the past 12 months or has a future visit scheduled within the next 90 days with the authorizing provider s specialty.  Urgent care and e-visits do not qualify as an office visit for this protocol.

## 2025-05-05 ENCOUNTER — PATIENT OUTREACH (OUTPATIENT)
Dept: CARE COORDINATION | Facility: CLINIC | Age: 30
End: 2025-05-05
Payer: COMMERCIAL

## 2025-05-07 ENCOUNTER — ANCILLARY PROCEDURE (OUTPATIENT)
Dept: ULTRASOUND IMAGING | Facility: CLINIC | Age: 30
End: 2025-05-07
Payer: COMMERCIAL

## 2025-05-07 DIAGNOSIS — N50.89 TESTICULAR MASS: ICD-10-CM

## 2025-05-07 PROCEDURE — 76870 US EXAM SCROTUM: CPT | Mod: GC | Performed by: RADIOLOGY

## 2025-05-08 ENCOUNTER — RESULTS FOLLOW-UP (OUTPATIENT)
Dept: INTERNAL MEDICINE | Facility: CLINIC | Age: 30
End: 2025-05-08